# Patient Record
(demographics unavailable — no encounter records)

---

## 2024-12-08 NOTE — ASSESSMENT
[FreeTextEntry1] : The patient remains clinically stable a baseline estimated GFR of 39 or stage 3B chronic kidney disease. . Given the patient's elevated blood pressure. However, I would resume the patients'. Norvasc at 5 milligrams daily and have masturbation to monitor her blood pressure at home.

## 2024-12-08 NOTE — PLAN
[TextEntry] :  I have discussed the laboratory values with the patient in detail, and have recommended, we initiation of Norvasc at a lower dose. Patients should monitor their blood pressure at home and return to this office for further evaluation and treatment options in six months unless their blood pressure is not well controlled, in which case the patient should return earlier.

## 2024-12-08 NOTE — HISTORY OF PRESENT ILLNESS
[FreeTextEntry1] : This is a 75-year-old female. With a history of hypertension, hyperlipidemia,, diabetes, mellitus type, 2, and chronic kidney disease stage, 3B. last seen and evaluated in this office on June 4th at 2024. At that time, The patient was noted to have increasing lower extremity edema while on Norvasc. The Norvasc was discontinued in the patient initiated on Demodex. The patient returns to the office today for further evaluation and Treatment options. Since the last evaluation, the patient has noted that her blood pressure has been increased while off the  Norvasc, despite the use of the diuretic. Currently, the patient denies . Any chest pain, shortness of breath,. Nausea , Vomiting or diarrhea.

## 2024-12-08 NOTE — RESULTS/DATA
[TextEntry] : BUN 30 SCr 1.41 CO2 29 K 4.2 eGFR 39  Tchol 153 LDL 73 Trig 155 a1c 5.9 UA 1.020 6 protein (-) blood (-) Vit D 28

## 2025-07-01 NOTE — ASSESSMENT
[FreeTextEntry1] : Clinically stable with baseline CKD IIIA The patients BP remains well controlled on enalapril and her diuretic.  Cholesterol is acceptable  A1c suggests pre-diabetes

## 2025-07-01 NOTE — RESULTS/DATA
[TextEntry] : BUN 29 SCr 1.21 CO2 26 K 4.2 eGFR 46  TChol 144 LDL 64 Trig 139 Sat 42% Ferr 177 A1c 5.9 Hgb 13.1 Hct 39.4 Alb/Cr 3

## 2025-07-01 NOTE — PLAN
[TextEntry] : I have reviewed an discussed the lab results and medical regimen with the patient in detail and have recommended a low carb diet given the elevated A1c.  In addition, I have encouraged continued compliance with her current medical regimen.  The patient should follow up in this office in 6 months for further recommendations and treatment options.

## 2025-07-01 NOTE — PHYSICAL EXAM
[General Appearance - Alert] : alert [General Appearance - In No Acute Distress] : in no acute distress [General Appearance - Well Nourished] : well nourished [General Appearance - Well Developed] : well developed [Hearing Threshold Finger Rub Not [place]] : hearing was normal [Jugular Venous Distention Increased] : there was no jugular-venous distention [] : no respiratory distress [Respiration, Rhythm And Depth] : normal respiratory rhythm and effort [Exaggerated Use Of Accessory Muscles For Inspiration] : no accessory muscle use [Auscultation Breath Sounds / Voice Sounds] : lungs were clear to auscultation bilaterally [Heart Rate And Rhythm] : heart rate was normal and rhythm regular [Heart Sounds] : normal S1 and S2 [Heart Sounds Gallop] : no gallops [Murmurs] : no murmurs [Heart Sounds Pericardial Friction Rub] : no pericardial rub [Edema] : there was no peripheral edema [Abdomen Tenderness] : non-tender [Nail Clubbing] : no clubbing  or cyanosis of the fingernails [No Focal Deficits] : no focal deficits [Oriented To Time, Place, And Person] : oriented to person, place, and time

## 2025-07-01 NOTE — HISTORY OF PRESENT ILLNESS
[FreeTextEntry1] : This is a 76-year-old female. With a history of hypertension, hyperlipidemia,, diabetes, mellitus type, 2, and chronic kidney disease stage, 3B. last seen and evaluated in this office on Dec 3, 2024  Since then, the patient has been in her usual state of health, not requiring hospitalization, nor undergoing any procedures.  She denies chest pain, shortness of breath, nausea, vomiting and diarrhea.  Her medical regimen remains unchanged and her appetite is